# Patient Record
Sex: FEMALE | Race: WHITE | NOT HISPANIC OR LATINO | URBAN - METROPOLITAN AREA
[De-identification: names, ages, dates, MRNs, and addresses within clinical notes are randomized per-mention and may not be internally consistent; named-entity substitution may affect disease eponyms.]

---

## 2024-03-29 ENCOUNTER — OV HOSPF/U (OUTPATIENT)
Dept: URBAN - METROPOLITAN AREA CLINIC 92 | Facility: CLINIC | Age: 69
End: 2024-03-29

## 2024-03-29 VITALS
TEMPERATURE: 96.9 F | BODY MASS INDEX: 30.39 KG/M2 | SYSTOLIC BLOOD PRESSURE: 143 MMHG | WEIGHT: 178 LBS | HEART RATE: 62 BPM | DIASTOLIC BLOOD PRESSURE: 87 MMHG | HEIGHT: 64 IN

## 2024-03-29 RX ORDER — ROSUVASTATIN CALCIUM 10 MG/1
TAKE 1 TABLET (10 MG) BY ORAL ROUTE ONCE DAILY AT BEDTIME TABLET, FILM COATED ORAL 1
Qty: 0 | Refills: 0 | Status: ACTIVE | COMMUNITY
Start: 1900-01-01

## 2024-03-29 RX ORDER — CITALOPRAM 40 MG/1
TAKE 1 TABLET (40 MG) BY ORAL ROUTE ONCE DAILY TABLET ORAL 1
Qty: 0 | Refills: 0 | Status: ACTIVE | COMMUNITY
Start: 1900-01-01

## 2024-03-29 NOTE — HPI-TODAY'S VISIT:
Patient is a 68 year old female who presents in follow up from her ED visit.   Patient was see at [facility] for back pain on 3/34/24. CT SCan revealed no urolithiasis, Moderate hazy inflammatory stranding in the central mesenteric root with numerous clustered small soft tissue nodules, highly suggestive of sclerosing mesenteritis, Indeterminate hypodense lesions in the left and right hepatic lobes, which are incompletely evaluated in the absence of intravenous contrast. 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 Consider further characterization with either nonemergent contrast-enhanced CT or MRI.  Back pain was due to a musculoskeletal etiology and was discharged with symptomatic management and out patient follow up.  Colonoscopy on 10/23/19 with Dr. Stevenson revealed lipomatous ileocecal valve, sigmoid diverticulosis, small IH, otherwise normal to TI, 10 yr repeat. Colon 11/07/11 with Dr. Nayak revealed a normal colon. Colon 11/16/04 revealed a normal colon.   Feeling well since her ED cisit, Notes of increased gas. Denies constipation, diarrhea, hematochezia, or melena.  Negative for Strep, Taking a abx, third day. Notes of a increased cough and sinus congestion   Denies fmhx of GI cancers.

## 2024-04-09 PROBLEM — 300331000: Status: ACTIVE | Noted: 2024-04-09

## 2024-04-25 ENCOUNTER — OV EP (OUTPATIENT)
Dept: URBAN - METROPOLITAN AREA CLINIC 92 | Facility: CLINIC | Age: 69
End: 2024-04-25

## 2025-05-07 ENCOUNTER — DASHBOARD ENCOUNTERS (OUTPATIENT)
Age: 70
End: 2025-05-07

## 2025-05-07 ENCOUNTER — OFFICE VISIT (OUTPATIENT)
Dept: URBAN - METROPOLITAN AREA CLINIC 105 | Facility: CLINIC | Age: 70
End: 2025-05-07
Payer: MEDICARE

## 2025-05-07 DIAGNOSIS — R14.3 EXCESSIVE GAS: ICD-10-CM

## 2025-05-07 DIAGNOSIS — K65.4 SCLEROSING MESENTERITIS: ICD-10-CM

## 2025-05-07 DIAGNOSIS — K59.09 CHRONIC CONSTIPATION: ICD-10-CM

## 2025-05-07 DIAGNOSIS — Z86.19 HISTORY OF HEPATITIS C: ICD-10-CM

## 2025-05-07 PROCEDURE — 99214 OFFICE O/P EST MOD 30 MIN: CPT | Performed by: INTERNAL MEDICINE

## 2025-05-07 RX ORDER — CITALOPRAM 40 MG/1
TAKE 1 TABLET (40 MG) BY ORAL ROUTE ONCE DAILY TABLET ORAL 1
Qty: 0 | Refills: 0 | Status: ACTIVE | COMMUNITY
Start: 1900-01-01

## 2025-05-07 RX ORDER — ROSUVASTATIN CALCIUM 10 MG/1
TAKE 1 TABLET (10 MG) BY ORAL ROUTE ONCE DAILY AT BEDTIME TABLET, FILM COATED ORAL 1
Qty: 0 | Refills: 0 | Status: ACTIVE | COMMUNITY
Start: 1900-01-01

## 2025-05-07 NOTE — HPI-TODAY'S VISIT:
69-year-old female with PMH of GERD, HLD, and anxiety/depression, presenting to discuss colonoscopy and fibroscan. She was seen by Keiko Porras PA-C in 3/2024 for f/u after ER visit with CT showing sclerosing mesenteritis. She subsequently had MRI confirming these findings and was referred to oncology for further evaluation. Her last colonoscopy was in 2019, with recall 10 yrs. Today, pt states she believes she is due for colonoscopy. No known family history of colon cancer. Brother and sister have had colon polyps; unsure at what age they started but they are 15 and 10 years older than her. Her son has Crohn's. Sister currently has bladder cancer.  She notes a lot of flatulence. She takes magnesium and has BMs "all the time." Has 2 BM/day. Denies straining. Does not skip days without BMs currently. Feels sensation of complete evacuation. States before taking magnesium, she would go weeks without having a BM.  She states she does sometimes have some pains in her abdomen. She notes that sometimes gas is causing this.  Has been trying to do more fermented sauerkraut. She does eat yogurt.  States she "has a bad liver" and had to have hepatitis C treatment. She does annual LFTs with your PCP. She would like to have fibroscan to check on this.  She never saw oncology after referral last visit.   Labs 10/2/24 - GFR 58, CMP otherwise normal.

## 2025-05-13 ENCOUNTER — CLAIMS CREATED FROM THE CLAIM WINDOW (OUTPATIENT)
Dept: URBAN - METROPOLITAN AREA CLINIC 117 | Facility: CLINIC | Age: 70
End: 2025-05-13
Payer: MEDICARE

## 2025-05-13 ENCOUNTER — OFFICE VISIT (OUTPATIENT)
Dept: URBAN - METROPOLITAN AREA CLINIC 85 | Facility: CLINIC | Age: 70
End: 2025-05-13
Payer: MEDICARE

## 2025-05-13 DIAGNOSIS — Z86.19 HISTORY OF HEPATITIS C: ICD-10-CM

## 2025-05-13 PROCEDURE — 76981 USE PARENCHYMA: CPT | Performed by: INTERNAL MEDICINE

## 2025-05-13 RX ORDER — ROSUVASTATIN CALCIUM 10 MG/1
TAKE 1 TABLET (10 MG) BY ORAL ROUTE ONCE DAILY AT BEDTIME TABLET, FILM COATED ORAL 1
Qty: 0 | Refills: 0 | Status: ACTIVE | COMMUNITY
Start: 1900-01-01

## 2025-05-13 RX ORDER — CITALOPRAM 40 MG/1
TAKE 1 TABLET (40 MG) BY ORAL ROUTE ONCE DAILY TABLET ORAL 1
Qty: 0 | Refills: 0 | Status: ACTIVE | COMMUNITY
Start: 1900-01-01

## 2025-07-14 ENCOUNTER — OFFICE VISIT (OUTPATIENT)
Dept: URBAN - METROPOLITAN AREA CLINIC 105 | Facility: CLINIC | Age: 70
End: 2025-07-14